# Patient Record
Sex: FEMALE | Race: BLACK OR AFRICAN AMERICAN | NOT HISPANIC OR LATINO | URBAN - METROPOLITAN AREA
[De-identification: names, ages, dates, MRNs, and addresses within clinical notes are randomized per-mention and may not be internally consistent; named-entity substitution may affect disease eponyms.]

---

## 2017-05-12 ENCOUNTER — OUTPATIENT (OUTPATIENT)
Dept: OUTPATIENT SERVICES | Facility: HOSPITAL | Age: 82
LOS: 1 days | End: 2017-05-12
Payer: MEDICARE

## 2017-05-12 PROCEDURE — 74177 CT ABD & PELVIS W/CONTRAST: CPT

## 2017-05-12 PROCEDURE — 74177 CT ABD & PELVIS W/CONTRAST: CPT | Mod: 26

## 2019-08-13 PROBLEM — Z00.00 ENCOUNTER FOR PREVENTIVE HEALTH EXAMINATION: Status: ACTIVE | Noted: 2019-08-13

## 2019-08-19 ENCOUNTER — APPOINTMENT (OUTPATIENT)
Dept: COLORECTAL SURGERY | Facility: CLINIC | Age: 84
End: 2019-08-19
Payer: MEDICARE

## 2019-08-19 VITALS
TEMPERATURE: 97.8 F | DIASTOLIC BLOOD PRESSURE: 76 MMHG | WEIGHT: 124 LBS | BODY MASS INDEX: 19.46 KG/M2 | HEIGHT: 67 IN | SYSTOLIC BLOOD PRESSURE: 145 MMHG | HEART RATE: 57 BPM

## 2019-08-19 DIAGNOSIS — I51.9 HEART DISEASE, UNSPECIFIED: ICD-10-CM

## 2019-08-19 DIAGNOSIS — E78.00 PURE HYPERCHOLESTEROLEMIA, UNSPECIFIED: ICD-10-CM

## 2019-08-19 DIAGNOSIS — I10 ESSENTIAL (PRIMARY) HYPERTENSION: ICD-10-CM

## 2019-08-19 DIAGNOSIS — Z85.048 PERSONAL HISTORY OF OTHER MALIGNANT NEOPLASM OF RECTUM, RECTOSIGMOID JUNCTION, AND ANUS: ICD-10-CM

## 2019-08-19 DIAGNOSIS — C20 MALIGNANT NEOPLASM OF RECTUM: ICD-10-CM

## 2019-08-19 DIAGNOSIS — F17.200 NICOTINE DEPENDENCE, UNSPECIFIED, UNCOMPLICATED: ICD-10-CM

## 2019-08-19 DIAGNOSIS — K63.5 POLYP OF COLON: ICD-10-CM

## 2019-08-19 DIAGNOSIS — Z63.4 DISAPPEARANCE AND DEATH OF FAMILY MEMBER: ICD-10-CM

## 2019-08-19 DIAGNOSIS — Z72.3 LACK OF PHYSICAL EXERCISE: ICD-10-CM

## 2019-08-19 PROCEDURE — 45300 PROCTOSIGMOIDOSCOPY DX: CPT

## 2019-08-19 PROCEDURE — 99214 OFFICE O/P EST MOD 30 MIN: CPT | Mod: 25

## 2019-08-19 RX ORDER — AMLODIPINE BESYLATE 10 MG/1
10 TABLET ORAL
Refills: 0 | Status: ACTIVE | COMMUNITY

## 2019-08-19 RX ORDER — ATORVASTATIN CALCIUM 10 MG/1
10 TABLET, FILM COATED ORAL
Refills: 0 | Status: ACTIVE | COMMUNITY

## 2019-08-19 RX ORDER — ATENOLOL 25 MG/1
25 TABLET ORAL
Refills: 0 | Status: ACTIVE | COMMUNITY

## 2019-08-19 SDOH — SOCIAL STABILITY - SOCIAL INSECURITY: DISSAPEARANCE AND DEATH OF FAMILY MEMBER: Z63.4

## 2019-08-19 NOTE — HISTORY OF PRESENT ILLNESS
[FreeTextEntry1] : 89 yo F w/ hx of rectal cancer presents for evaluation of weight loss, referred by Dr. Webb (PCP)\par \par Pt c/o 11-12 lbs weight loss in the last 3 months. Reports low appetite and admits to low food intake, however has been this way for approximately 1 year. Has dentures however doesn't use them, denies pain w/ denture use.\par Energy levels unchanged, some SOB w/ climbing stairs\par Has tea and a pizza roll every morning, may eat once later in the day (chinese food or pizza)\par Denies fevers, abdominal pain, n/v, BPR or rectal pain\par \par PMH rectal CA diagnosed 5/2017 on initial colonoscopy w/ polyp showing cancer w/ LVI arising in TA\par Last seen at [facility] 5/2017. Denies hx of CRT or additional surgeries.\par Denies CRT or colonoscopy since diagnosis\par Denies ASA/NSAID in last 7 days

## 2019-08-19 NOTE — ASSESSMENT
[FreeTextEntry1] : I have reviewed with the patient that at this time given her prior history of a T1 rectal cancer malignant polyp with lymphovascular invasion she is at increased risk for potential recurrent cancer as well as a possible explanation for her recent 11 pound weight loss. I therefore recommended that we proceed with labs and a surveillance CT scan of the chest abdomen and pelvis. Pending review of the studies further treatment recommendations will be forthcoming.\par \par

## 2019-08-19 NOTE — PHYSICAL EXAM
[Abdomen Masses] : No abdominal masses [Abdomen Tenderness] : ~T No ~M abdominal tenderness [No HSM] : no hepatosplenomegaly [Excoriation] : no perianal excoriation [Normal] : was normal [None] : there was no rectal mass  [FreeTextEntry1] : A rigid proctosigmoidoscope was passed through he anus into the rectum to 12  cm. . The mucosal surface were inspected. The patient tolerated the procedure well.\par \par The findings revealed:\par at 10 and 12 cm Carmen ink tattoo markings. No polyps, masses or lesions.

## 2019-08-20 LAB
ALBUMIN SERPL ELPH-MCNC: 4.1 G/DL
ALP BLD-CCNC: 137 U/L
ALT SERPL-CCNC: 9 U/L
ANION GAP SERPL CALC-SCNC: 14 MMOL/L
AST SERPL-CCNC: 17 U/L
BASOPHILS # BLD AUTO: 0.06 K/UL
BASOPHILS NFR BLD AUTO: 0.9 %
BILIRUB SERPL-MCNC: 0.6 MG/DL
BUN SERPL-MCNC: 11 MG/DL
CALCIUM SERPL-MCNC: 9.4 MG/DL
CEA SERPL-MCNC: 4.7 NG/ML
CHLORIDE SERPL-SCNC: 105 MMOL/L
CO2 SERPL-SCNC: 24 MMOL/L
CREAT SERPL-MCNC: 1.05 MG/DL
EOSINOPHIL # BLD AUTO: 0.26 K/UL
EOSINOPHIL NFR BLD AUTO: 3.7 %
GLUCOSE SERPL-MCNC: 95 MG/DL
HCT VFR BLD CALC: 40.6 %
HGB BLD-MCNC: 13.5 G/DL
IMM GRANULOCYTES NFR BLD AUTO: 0.3 %
LYMPHOCYTES # BLD AUTO: 1.47 K/UL
LYMPHOCYTES NFR BLD AUTO: 21.1 %
MAN DIFF?: NORMAL
MCHC RBC-ENTMCNC: 26.3 PG
MCHC RBC-ENTMCNC: 33.3 GM/DL
MCV RBC AUTO: 79.1 FL
MONOCYTES # BLD AUTO: 0.64 K/UL
MONOCYTES NFR BLD AUTO: 9.2 %
NEUTROPHILS # BLD AUTO: 4.51 K/UL
NEUTROPHILS NFR BLD AUTO: 64.8 %
PLATELET # BLD AUTO: 248 K/UL
POTASSIUM SERPL-SCNC: 3.4 MMOL/L
PROT SERPL-MCNC: 7.3 G/DL
RBC # BLD: 5.13 M/UL
RBC # FLD: 15.8 %
SODIUM SERPL-SCNC: 143 MMOL/L
WBC # FLD AUTO: 6.96 K/UL

## 2019-09-12 ENCOUNTER — FORM ENCOUNTER (OUTPATIENT)
Age: 84
End: 2019-09-12

## 2019-09-13 ENCOUNTER — APPOINTMENT (OUTPATIENT)
Dept: CT IMAGING | Facility: HOSPITAL | Age: 84
End: 2019-09-13
Payer: MEDICARE

## 2019-09-13 ENCOUNTER — OUTPATIENT (OUTPATIENT)
Dept: OUTPATIENT SERVICES | Facility: HOSPITAL | Age: 84
LOS: 1 days | End: 2019-09-13
Payer: MEDICARE

## 2019-09-13 PROCEDURE — 74177 CT ABD & PELVIS W/CONTRAST: CPT

## 2019-09-13 PROCEDURE — 74177 CT ABD & PELVIS W/CONTRAST: CPT | Mod: 26

## 2019-09-13 PROCEDURE — 71260 CT THORAX DX C+: CPT

## 2019-09-13 PROCEDURE — 71260 CT THORAX DX C+: CPT | Mod: 26

## 2019-09-22 ENCOUNTER — FORM ENCOUNTER (OUTPATIENT)
Age: 84
End: 2019-09-22

## 2019-09-23 ENCOUNTER — OUTPATIENT (OUTPATIENT)
Dept: OUTPATIENT SERVICES | Facility: HOSPITAL | Age: 84
LOS: 1 days | End: 2019-09-23
Payer: MEDICARE

## 2019-09-23 PROCEDURE — 78815 PET IMAGE W/CT SKULL-THIGH: CPT

## 2019-09-23 PROCEDURE — 78815 PET IMAGE W/CT SKULL-THIGH: CPT | Mod: 26

## 2019-09-23 PROCEDURE — A9552: CPT

## 2019-09-23 PROCEDURE — 82962 GLUCOSE BLOOD TEST: CPT

## 2019-10-03 ENCOUNTER — APPOINTMENT (OUTPATIENT)
Dept: THORACIC SURGERY | Facility: CLINIC | Age: 84
End: 2019-10-03
Payer: MEDICARE

## 2019-10-03 VITALS
BODY MASS INDEX: 19.15 KG/M2 | DIASTOLIC BLOOD PRESSURE: 66 MMHG | HEIGHT: 67 IN | SYSTOLIC BLOOD PRESSURE: 134 MMHG | WEIGHT: 122 LBS | HEART RATE: 64 BPM | OXYGEN SATURATION: 98 % | TEMPERATURE: 96 F | RESPIRATION RATE: 17 BRPM

## 2019-10-03 DIAGNOSIS — R91.1 SOLITARY PULMONARY NODULE: ICD-10-CM

## 2019-10-03 PROCEDURE — 99203 OFFICE O/P NEW LOW 30 MIN: CPT

## 2019-10-03 NOTE — PHYSICAL EXAM
[General Appearance - Alert] : alert [] : no respiratory distress [Respiration, Rhythm And Depth] : normal respiratory rhythm and effort [Exaggerated Use Of Accessory Muscles For Inspiration] : no accessory muscle use [Heart Rate And Rhythm] : heart rate was normal and rhythm regular [Apical Impulse] : the apical impulse was normal [Heart Sounds] : normal S1 and S2 [2+] : left 2+ [Oriented To Time, Place, And Person] : oriented to person, place, and time

## 2019-10-04 NOTE — HISTORY OF PRESENT ILLNESS
[FreeTextEntry1] : 89 yr old female, current smoker (1/2 pakc for 40 years) with a PMH of rectal cancer diagnosed in 2017 with polyp showing cancer with LVI arising in TA and recent recent weight loss (12 lbs in the last 2 months). She was evaluated with a CT chest/abdomen/pelvis that revealed a Left upper lobe nodule and presents today for an evaluation. Patient being referred by Dr. Edmondson. \par \par CT chest completed on 09/13/19:\par -1.2 cm nodule posterior medial left upper lobe on 3:72, indeterm\par nodule left upper lobe 3:71. Scattered additional micro nodules nonspecific.\par \par PET CT completed on 09/23/19:\par 1. The 11 mm nodule in the left upper lobe is mildly FDG avid. It is suspicious for malignancy\par represent a primary lung cancer or a metastasis. Biopsy recommended. \par 2. Smaller lung nodules bilaterally are not FDG avid, which may be due to their small size. \par 3. There is a hypermetabolic right hilar lymph node, of uncertain significance.

## 2019-10-04 NOTE — ASSESSMENT
[FreeTextEntry1] : 89 yr old female, current smoker with a PMH of rectal cancer diagnosed in 2017 with polyp showing cancer with LVI arising in TA . Surveillance imaging revealed a Left upper lobe nodule. \par \par Patient has a Left upper lobe nodule as seen on a CT chest/abdomen/pelvis. I reviewed the images with the patient and her nephew. Given patient's history of Colon cancer, I explained that this is possibly a metastasis vs a new primary tumor of the lung in the setting of her smoking history.\par \par Patient declines to undergo surgery, declines consideration of surgery, is an active smoker and won't quit and given her age, and the aforementioned, the patient will require medical and radiation oncology evaluation as well as pulmonary evaluation to assess diagnosis, staging and  treatment planning. \par \par The nodule would probable require navigation and EBUS-guided biopsy for diagnosis and mediastinal staging. Will refer patient to a pulmonologist for this as well as to a medical oncologist for formal evaluation. Explained to the patient that if the biopsy results determines the nodule as a metastasis, patient will likely need chemotherapy. If the results reveals this to be a new primary tumor, then patient will likely need to receive radiation therapy. \par \par Smoking cessation was discussed with the patient. She is strongly encouraged to stop smoking and was educated on the benefits of smoking cessation which includes:\par "1. Your blood pressure will drop back down to normal within 20 minutes. \par 2. Within eight hours the carbon monoxide levels in your bloodstream should decrease by about one half and oxygen level should return to normal. \par 3. In 48 hours, your chance of having a heart attack will have decreased. All the nicotine will have left your body and your sense of taste and smell should return to a normal level. \par 4. In 72 hours, your bronchial tubes will relax and your energy levels will increase. \par 5. After two weeks, your circulation should increase and it will continue to improve for the next 10 weeks. \par 6. From 3 - 9 months after quitting smoking, coughing, wheezing and breathing problems will dissipate as your lung capacity should improve by 10%. \par 7. In one year, your risk of having a heart attack will have dropped by one half. \par 8. After five years of smoking cessation, your risk of having a stroke returns to that of a non-smoker. \par 9. After 10 years, your risk of lung cancer will return to that of a non-smoker. \par 10. Finally, after 15 years, risk of heart attack will return to that of a non-smoker." - <https://excommunity.becomeanex.org>; <www.cdc.org>\par \par I have reviewed the patient's medical records and diagnostic images at the time of this office visit and have made the following recommendations\par Plan:\par 1. ACP referral Pulmonologist for EBUS-guided Biopsy and PFT's \par 2. ACP referral  Medical Oncologist and radiation oncology\par

## 2019-10-04 NOTE — END OF VISIT
[FreeTextEntry3] : All medical record entries made by the Scribe were at my, Dr. Plummer's direction and personally dictated by me on 10/03/2019 . I have reviewed the chart and agree that the record accurately reflects my personal performance of the history, physical exam, assessment and plan. I have also personally directed, reviewed, and agreed with the chart.\par

## 2019-10-04 NOTE — ADDENDUM
[FreeTextEntry1] : Documented by Farzana Lopes acting as a scribe for Dr. Dimas Plummer on 10/03/2019\par